# Patient Record
Sex: FEMALE | Race: BLACK OR AFRICAN AMERICAN | NOT HISPANIC OR LATINO | Employment: FULL TIME | URBAN - METROPOLITAN AREA
[De-identification: names, ages, dates, MRNs, and addresses within clinical notes are randomized per-mention and may not be internally consistent; named-entity substitution may affect disease eponyms.]

---
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
## 2024-01-08 ENCOUNTER — OFFICE VISIT (OUTPATIENT)
Dept: FAMILY MEDICINE | Facility: CLINIC | Age: 19
End: 2024-01-08
Payer: MEDICAID

## 2024-01-08 VITALS
TEMPERATURE: 99 F | BODY MASS INDEX: 21.36 KG/M2 | OXYGEN SATURATION: 98 % | RESPIRATION RATE: 18 BRPM | HEIGHT: 60 IN | HEART RATE: 70 BPM | SYSTOLIC BLOOD PRESSURE: 121 MMHG | WEIGHT: 108.81 LBS | DIASTOLIC BLOOD PRESSURE: 87 MMHG

## 2024-01-08 DIAGNOSIS — R10.9 ABDOMINAL PAIN, UNSPECIFIED ABDOMINAL LOCATION: Primary | ICD-10-CM

## 2024-01-08 DIAGNOSIS — E83.52 HYPERCALCEMIA: ICD-10-CM

## 2024-01-08 LAB
ALBUMIN SERPL-MCNC: 4.6 G/DL (ref 3.5–5)
ALBUMIN/GLOB SERPL: 1.1 RATIO (ref 1.1–2)
ALP SERPL-CCNC: 59 UNIT/L (ref 40–150)
ALT SERPL-CCNC: 7 UNIT/L (ref 0–55)
AST SERPL-CCNC: 18 UNIT/L (ref 5–34)
BILIRUB SERPL-MCNC: 0.7 MG/DL
BUN SERPL-MCNC: 9.9 MG/DL (ref 7–18.7)
CALCIUM SERPL-MCNC: 9.8 MG/DL (ref 8.4–10.2)
CHLORIDE SERPL-SCNC: 104 MMOL/L (ref 98–107)
CO2 SERPL-SCNC: 26 MMOL/L (ref 22–29)
CREAT SERPL-MCNC: 0.96 MG/DL (ref 0.55–1.02)
GFR SERPLBLD CREATININE-BSD FMLA CKD-EPI: >60 MLS/MIN/1.73/M2
GLOBULIN SER-MCNC: 4.2 GM/DL (ref 2.4–3.5)
GLUCOSE SERPL-MCNC: 81 MG/DL (ref 74–100)
POTASSIUM SERPL-SCNC: 4.2 MMOL/L (ref 3.5–5.1)
PROT SERPL-MCNC: 8.8 GM/DL (ref 6.4–8.3)
SODIUM SERPL-SCNC: 138 MMOL/L (ref 136–145)

## 2024-01-08 PROCEDURE — 36415 COLL VENOUS BLD VENIPUNCTURE: CPT

## 2024-01-08 PROCEDURE — 80053 COMPREHEN METABOLIC PANEL: CPT

## 2024-01-08 PROCEDURE — 99214 OFFICE O/P EST MOD 30 MIN: CPT | Mod: PBBFAC

## 2024-01-08 RX ORDER — POLYETHYLENE GLYCOL 3350 17 G/17G
17 POWDER, FOR SOLUTION ORAL DAILY
COMMUNITY
Start: 2023-12-27

## 2024-01-08 RX ORDER — ONDANSETRON 4 MG/1
8 TABLET, FILM COATED ORAL EVERY 8 HOURS PRN
COMMUNITY
Start: 2023-12-27

## 2024-01-08 RX ORDER — HYOSCYAMINE SULFATE 0.12 MG/1
0.12 TABLET SUBLINGUAL EVERY 6 HOURS PRN
COMMUNITY
Start: 2023-12-27

## 2024-01-08 NOTE — PROGRESS NOTES
Discussed with resident at time of encounter 01-08-24.  Intermittent abdominal discomfort since 09/2023; variable intensity.  Recent ED visit.    Resident's note reviewed 01-08-24.  Agree with assessment; plan of care appropriate.  Follow-up ordered studies.  Professional services provided in an outpatient primary care center affiliated with a teaching institution.

## 2024-01-08 NOTE — PROGRESS NOTES
[facility]    Subjective:      Chief Complaint: Hospital Follow Up    HPI  Jena Tabor is a 18 y.o. female who presents to [facility] for ED follow-up due to abdominal pain on 12/26/2023. Generalized abdominal pain began around September, fairly constant with episodes of increased pain, associated with difficulties passing stool for 1-2 days. States pain is typically positional in nature, increases with valsalva, unable to reproduce pain in clinic today. Rated 6/10. She also has occasional nausea. Pain moderately relieved by hyoscyamine. Of note she does take naproxen 2-3 times per month due to dysmenorrhea. KUB obtained in ED showed retained stool without obstruction. Diet consists of no fried or spicy foods, primarily eats healthy options since abdominal pain started. Denies possibility of pregnancy, LMP 12/18/2023. Denies abdominal injury, urinary symptoms, diarrhea/constipation, bilious emesis, chest pain, palpitations, SOB, menorrhagia, vaginal discharge.     Review of Systems  As per HPI.    Objective:     /87 (BP Location: Right arm, Patient Position: Sitting, BP Method: Medium (Automatic))   Pulse 70   Temp 98.8 °F (37.1 °C) (Oral)   Resp 18   Ht 5' (1.524 m)   Wt 49.4 kg (108 lb 12.8 oz)   SpO2 98%   BMI 21.25 kg/m²     Physical Exam:  Gen: No acute distress, pleasant female  CV: RRR, no murmurs.  Resp: CTAB, breathing non labored on room air.  Abd: Soft, non-distended. Mildly tender to RUQ, no guarding. Guido's sign negative. No tenderness in other quadrants. No tenderness over McBurney's point. No CVA tenderness. No apparent herniation.   Skin: Skin intact over entire abdomen. Umbilical ring without erythema or induration, no active drainage, appears well healed.       Current Outpatient Medications:     hyoscyamine (LEVSIN/SL) 0.125 mg Subl, Place 0.125 mg under the tongue every 6 (six) hours as needed., Disp: , Rfl:     ondansetron (ZOFRAN) 4 MG tablet, Take 8 mg by mouth  every 8 (eight) hours as needed., Disp: , Rfl:     polyethylene glycol (GLYCOLAX) 17 gram/dose powder, Take 17 g by mouth once daily., Disp: , Rfl:     diclofenac sodium (VOLTAREN) 1 % Gel, Apply 2 g topically 4 (four) times daily., Disp: 100 g, Rfl: 1    fluticasone propionate (FLONASE) 50 mcg/actuation nasal spray, 1 spray (50 mcg total) by Each Nostril route once daily., Disp: 9.9 mL, Rfl: 0    naproxen (NAPROSYN) 250 MG tablet, Take 250 mg by mouth 2 (two) times daily as needed., Disp: , Rfl:      Assessment/Plan:     Chronic generalized abdominal pain  Hypercalcemia  - Ca 10.9 on outside labs  - Repeat CMP ordered  - RUQ US ordered, consider CTAP if pain continues  - Continue hyoscyamine PRN for pain and Zofran PRN for nausea  - Counseled on dietary habits and increased fiber intake, continue Miralax qd  - ED precautions discussed    Follow-up: 6 weeks for abdominal pain    Tariq Dominique MD   [employer] [facility] - PGY-II

## 2024-01-09 ENCOUNTER — HOSPITAL ENCOUNTER (EMERGENCY)
Facility: HOSPITAL | Age: 19
Discharge: HOME | End: 2024-01-10
Attending: STUDENT IN AN ORGANIZED HEALTH CARE EDUCATION/TRAINING PROGRAM
Payer: MEDICAID

## 2024-01-09 DIAGNOSIS — K59.09 CHRONIC CONSTIPATION: ICD-10-CM

## 2024-01-09 DIAGNOSIS — R10.9 ABDOMINAL CRAMPING: Primary | ICD-10-CM

## 2024-01-09 LAB
ALBUMIN SERPL-MCNC: 4.2 G/DL (ref 3.5–5)
ALBUMIN/GLOB SERPL: 1 RATIO (ref 1.1–2)
ALP SERPL-CCNC: 57 UNIT/L (ref 40–150)
ALT SERPL-CCNC: 7 UNIT/L (ref 0–55)
APPEARANCE UR: CLEAR
AST SERPL-CCNC: 18 UNIT/L (ref 5–34)
B-HCG SERPL QL: NEGATIVE
BACTERIA #/AREA URNS AUTO: ABNORMAL /HPF
BASOPHILS # BLD AUTO: 0.02 X10(3)/MCL
BASOPHILS NFR BLD AUTO: 0.4 %
BILIRUB SERPL-MCNC: 0.9 MG/DL
BILIRUB UR QL STRIP.AUTO: NEGATIVE
BUN SERPL-MCNC: 10.6 MG/DL (ref 7–18.7)
CALCIUM SERPL-MCNC: 10.2 MG/DL (ref 8.4–10.2)
CHLORIDE SERPL-SCNC: 105 MMOL/L (ref 98–107)
CO2 SERPL-SCNC: 24 MMOL/L (ref 22–29)
COLOR UR AUTO: COLORLESS
CREAT SERPL-MCNC: 0.92 MG/DL (ref 0.55–1.02)
EOSINOPHIL # BLD AUTO: 0.16 X10(3)/MCL (ref 0–0.9)
EOSINOPHIL NFR BLD AUTO: 2.9 %
ERYTHROCYTE [DISTWIDTH] IN BLOOD BY AUTOMATED COUNT: 12 % (ref 11.5–17)
GFR SERPLBLD CREATININE-BSD FMLA CKD-EPI: >60 MLS/MIN/1.73/M2
GLOBULIN SER-MCNC: 4.2 GM/DL (ref 2.4–3.5)
GLUCOSE SERPL-MCNC: 88 MG/DL (ref 74–100)
GLUCOSE UR QL STRIP.AUTO: NORMAL
HCT VFR BLD AUTO: 40 % (ref 37–47)
HGB BLD-MCNC: 13.3 G/DL (ref 12–16)
IMM GRANULOCYTES # BLD AUTO: 0.01 X10(3)/MCL (ref 0–0.04)
IMM GRANULOCYTES NFR BLD AUTO: 0.2 %
KETONES UR QL STRIP.AUTO: NEGATIVE
LEUKOCYTE ESTERASE UR QL STRIP.AUTO: 25
LIPASE SERPL-CCNC: 14 U/L
LYMPHOCYTES # BLD AUTO: 1.61 X10(3)/MCL (ref 0.6–4.6)
LYMPHOCYTES NFR BLD AUTO: 29 %
MCH RBC QN AUTO: 28.4 PG (ref 27–31)
MCHC RBC AUTO-ENTMCNC: 33.3 G/DL (ref 33–36)
MCV RBC AUTO: 85.5 FL (ref 80–94)
MONOCYTES # BLD AUTO: 0.35 X10(3)/MCL (ref 0.1–1.3)
MONOCYTES NFR BLD AUTO: 6.3 %
NEUTROPHILS # BLD AUTO: 3.41 X10(3)/MCL (ref 2.1–9.2)
NEUTROPHILS NFR BLD AUTO: 61.2 %
NITRITE UR QL STRIP.AUTO: NEGATIVE
NRBC BLD AUTO-RTO: 0 %
PH UR STRIP.AUTO: 6 [PH]
PLATELET # BLD AUTO: 269 X10(3)/MCL (ref 130–400)
PMV BLD AUTO: 11 FL (ref 7.4–10.4)
POTASSIUM SERPL-SCNC: 4.1 MMOL/L (ref 3.5–5.1)
PROT SERPL-MCNC: 8.4 GM/DL (ref 6.4–8.3)
PROT UR QL STRIP.AUTO: NEGATIVE
RBC # BLD AUTO: 4.68 X10(6)/MCL (ref 4.2–5.4)
RBC #/AREA URNS AUTO: ABNORMAL /HPF
RBC UR QL AUTO: NEGATIVE
SODIUM SERPL-SCNC: 136 MMOL/L (ref 136–145)
SP GR UR STRIP.AUTO: 1.01 (ref 1–1.03)
SQUAMOUS #/AREA URNS LPF: ABNORMAL /HPF
UROBILINOGEN UR STRIP-ACNC: NORMAL
WBC # SPEC AUTO: 5.56 X10(3)/MCL (ref 4.5–11.5)
WBC #/AREA URNS AUTO: ABNORMAL /HPF

## 2024-01-09 PROCEDURE — 81001 URINALYSIS AUTO W/SCOPE: CPT | Performed by: NURSE PRACTITIONER

## 2024-01-09 PROCEDURE — 99285 EMERGENCY DEPT VISIT HI MDM: CPT | Mod: 25

## 2024-01-09 PROCEDURE — 85025 COMPLETE CBC W/AUTO DIFF WBC: CPT | Performed by: NURSE PRACTITIONER

## 2024-01-09 PROCEDURE — 80053 COMPREHEN METABOLIC PANEL: CPT | Performed by: NURSE PRACTITIONER

## 2024-01-09 PROCEDURE — 81025 URINE PREGNANCY TEST: CPT | Performed by: NURSE PRACTITIONER

## 2024-01-09 PROCEDURE — 83690 ASSAY OF LIPASE: CPT | Performed by: NURSE PRACTITIONER

## 2024-01-09 RX ORDER — ONDANSETRON 2 MG/ML
4 INJECTION INTRAMUSCULAR; INTRAVENOUS
Status: DISCONTINUED | OUTPATIENT
Start: 2024-01-09 | End: 2024-01-10 | Stop reason: HOSPADM

## 2024-01-09 RX ADMIN — IOPAMIDOL 100 ML: 755 INJECTION, SOLUTION INTRAVENOUS at 11:01

## 2024-01-09 NOTE — FIRST PROVIDER EVALUATION
Medical screening examination initiated.  I have conducted a focused provider triage encounter, findings are as follows:    Brief history of present illness:  Patient states abdominal pain, nausea, vomiting, and diarrhea.     There were no vitals filed for this visit.    Pertinent physical exam:  Awake, alert, ambulatory    Brief workup plan:  Labs    Preliminary workup initiated; this workup will be continued and followed by the physician or advanced practice provider that is assigned to the patient when roomed.

## 2024-01-10 VITALS
TEMPERATURE: 98 F | RESPIRATION RATE: 18 BRPM | WEIGHT: 108 LBS | HEIGHT: 62 IN | OXYGEN SATURATION: 100 % | DIASTOLIC BLOOD PRESSURE: 73 MMHG | BODY MASS INDEX: 19.88 KG/M2 | SYSTOLIC BLOOD PRESSURE: 124 MMHG | HEART RATE: 76 BPM

## 2024-01-10 PROCEDURE — 25500020 PHARM REV CODE 255: Performed by: PHYSICIAN ASSISTANT

## 2024-01-10 RX ORDER — HYOSCYAMINE SULFATE 0.12 MG/1
0.12 TABLET SUBLINGUAL EVERY 6 HOURS PRN
Qty: 20 TABLET | Refills: 0 | Status: SHIPPED | OUTPATIENT
Start: 2024-01-10 | End: 2024-01-15

## 2024-01-10 RX ORDER — ONDANSETRON 4 MG/1
4 TABLET, FILM COATED ORAL EVERY 6 HOURS
Qty: 12 TABLET | Refills: 0 | Status: SHIPPED | OUTPATIENT
Start: 2024-01-10

## 2024-01-11 NOTE — PROGRESS NOTES
Pt called inquiring about status of referral for GI. Provided [facility] clinic contact.

## 2024-01-19 ENCOUNTER — OFFICE VISIT (OUTPATIENT)
Dept: FAMILY MEDICINE | Facility: CLINIC | Age: 19
End: 2024-01-19
Payer: MEDICAID

## 2024-01-19 VITALS
OXYGEN SATURATION: 100 % | TEMPERATURE: 98 F | BODY MASS INDEX: 20.06 KG/M2 | HEIGHT: 62 IN | HEART RATE: 58 BPM | DIASTOLIC BLOOD PRESSURE: 70 MMHG | WEIGHT: 109 LBS | SYSTOLIC BLOOD PRESSURE: 108 MMHG | RESPIRATION RATE: 17 BRPM

## 2024-01-19 DIAGNOSIS — R10.84 GENERALIZED ABDOMINAL PAIN: Primary | ICD-10-CM

## 2024-01-19 LAB
HIV 1+2 AB+HIV1 P24 AG SERPL QL IA: NONREACTIVE
TSH SERPL-ACNC: 1.17 UIU/ML (ref 0.35–4.94)

## 2024-01-19 PROCEDURE — 99215 OFFICE O/P EST HI 40 MIN: CPT | Mod: PBBFAC | Performed by: STUDENT IN AN ORGANIZED HEALTH CARE EDUCATION/TRAINING PROGRAM

## 2024-01-19 PROCEDURE — 36415 COLL VENOUS BLD VENIPUNCTURE: CPT | Performed by: STUDENT IN AN ORGANIZED HEALTH CARE EDUCATION/TRAINING PROGRAM

## 2024-01-19 PROCEDURE — 87389 HIV-1 AG W/HIV-1&-2 AB AG IA: CPT | Performed by: STUDENT IN AN ORGANIZED HEALTH CARE EDUCATION/TRAINING PROGRAM

## 2024-01-19 PROCEDURE — 84443 ASSAY THYROID STIM HORMONE: CPT | Performed by: STUDENT IN AN ORGANIZED HEALTH CARE EDUCATION/TRAINING PROGRAM

## 2024-01-19 RX ORDER — ONDANSETRON 4 MG/1
4 TABLET, ORALLY DISINTEGRATING ORAL EVERY 8 HOURS PRN
COMMUNITY
Start: 2024-01-14 | End: 2024-01-21

## 2024-01-19 RX ORDER — PANTOPRAZOLE SODIUM 40 MG/1
1 TABLET, DELAYED RELEASE ORAL EVERY MORNING
COMMUNITY
Start: 2024-01-14 | End: 2025-01-13

## 2024-01-19 RX ORDER — SUCRALFATE 1 G/10ML
1 SUSPENSION ORAL
COMMUNITY
Start: 2024-01-14 | End: 2024-02-13

## 2024-01-19 RX ORDER — FAMOTIDINE 20 MG/1
20 TABLET, FILM COATED ORAL 2 TIMES DAILY
COMMUNITY
Start: 2024-01-14 | End: 2025-01-13

## 2024-01-19 NOTE — LETTER
January 19, 2024    Jena Tabor  [address] [facility] [address]             [facility]  Family Medicine  [facility] [facility] [address]  Phone: [phone]   January 19, 2024     Patient: Jena Tabor   YOB: 2005   Date of Visit: 1/19/2024       To Whom it May Concern:    Jena Tabor was seen in my clinic on 1/19/2024. She was accompanied by her mother Jaida Tabor.    Please excuse her and her mother from any work/classes missed.    If you have any questions or concerns, please don't hesitate to call.    Sincerely,         Laura Nix MD

## 2024-01-19 NOTE — PROGRESS NOTES
[facility] OFFICE VISIT NOTE  Jena Tabor  [id number]  01/19/2024      Chief Complaint: GI Problem, Fatigue, Medication Refill (Naproxen), Referral (GI), and Food Insecurity Absent      HPI    18 y.o. female     Generalized abdominal pain, onset 12/26/23  - sharp, constant with fluctuating intensity, no radiation   - associated with intermittent nausea and vomiting and fatigue  - onset out of the blue, no known triggers  - came home from college in December  - ate mostly Ramen and fried food in college, now only tolerating bland foods (toast, soaps, pasta, rice, etc.)   - no association with foods, timing of menstrual period or bowel movements  - currently on menstrual period, not sexually active    - constipated for 1-2 months prior to onset of severe abdominal pain    - last bowel movement 2 days ago- hard and small volume, has bowel movement once every few days  - seen in ED and clinic multiple times since onset, work up so far unrevealing (lipase, CBC, CMP nml, UPT neg, UA neg, CT Abd/Pelvis physiologic pelvic fluid,  RUQ US- gallbladder, pancreas, liver unremarkable)   - Zofran relieves nausea short term, other medications currently taking with little to no improvement- Levsin, Miralax, famotidine, Protonix and sucralfate   - weight appears stable since onset of symptoms   - occasional moderate alcohol use- none since onset of pain, denies tobacco, marijuana and other drug use   - denies frequent NSAID use  - paternal grandmother with ulcerative colitis, maternal grandmother with rheumatoid arthritis and gallbladder disease, no known history of GI cancers or colon polyps  - no previous h/o similar abdominal pain  - no recent international travel or contact with similar symptoms       ROS:  As per HPI      PE:  Vitals:    01/19/24 1351   BP: 108/70   Pulse: (!) 58   Resp: 17   Temp: 98.1 °F (36.7 °C)     General: appears well, in no acute distress   Eye: no scleral icterus   HENT: oral mucosa without erosion or  erythema   Neck: no goiter   Respiratory: clear to auscultation bilaterally, nonlabored respirations   Cardiovascular: borderline bradycardia, regular rhythm, murmurs   Gastrointestinal: soft, generalized tenderness to superficial ad deep palpation more prominent in RUQ and epigastric region, non-distended, bowel sounds present, no rebound or guarding   Genitourinary: no suprapubic tenderness, no CVA tenderness    Extremities: no edema in bilateral lower extremities    Assessment:   1. Generalized abdominal pain        Plan:  - reviewed work up to date with patient and mom   - medication holiday for 2-3 days, then re-introduce each medication one at a time to re-evaluate response  - okay to increase Miralax to BID dosing until stool soft  - referral GI (Dr. ARVIND Yanez- [place])  - H pylori stool Ag, TSH and HIV  - US pelvis   - recommended avoidance of anti-inflammatories due to possibility for gastritis/ peptic ulcer   - return/ED precautions provided     Keep previously schedule follow up in 1 month, or sooner if needed.     Laura Nix M.D. -III   [facility] [place]

## 2024-01-22 ENCOUNTER — LAB VISIT (OUTPATIENT)
Dept: LAB | Facility: HOSPITAL | Age: 19
End: 2024-01-22
Attending: STUDENT IN AN ORGANIZED HEALTH CARE EDUCATION/TRAINING PROGRAM
Payer: MEDICAID

## 2024-01-22 LAB — H. PYLORI STOOL: NEGATIVE

## 2024-01-31 ENCOUNTER — HOSPITAL ENCOUNTER (OUTPATIENT)
Dept: RADIOLOGY | Facility: HOSPITAL | Age: 19
Discharge: HOME | End: 2024-01-31
Payer: MEDICAID

## 2024-01-31 ENCOUNTER — HOSPITAL ENCOUNTER (OUTPATIENT)
Dept: RADIOLOGY | Facility: HOSPITAL | Age: 19
Discharge: HOME | End: 2024-01-31
Attending: STUDENT IN AN ORGANIZED HEALTH CARE EDUCATION/TRAINING PROGRAM
Payer: MEDICAID

## 2024-01-31 DIAGNOSIS — R10.9 ABDOMINAL PAIN, UNSPECIFIED ABDOMINAL LOCATION: ICD-10-CM

## 2024-01-31 DIAGNOSIS — R10.84 GENERALIZED ABDOMINAL PAIN: ICD-10-CM

## 2024-01-31 PROCEDURE — 76705 ECHO EXAM OF ABDOMEN: CPT | Mod: TC

## 2024-01-31 PROCEDURE — 76856 US EXAM PELVIC COMPLETE: CPT | Mod: TC

## 2024-03-20 ENCOUNTER — OFFICE VISIT (OUTPATIENT)
Dept: FAMILY MEDICINE | Facility: CLINIC | Age: 19
End: 2024-03-20
Payer: COMMERCIAL

## 2024-03-20 VITALS
HEIGHT: 62 IN | RESPIRATION RATE: 18 BRPM | TEMPERATURE: 98 F | DIASTOLIC BLOOD PRESSURE: 74 MMHG | SYSTOLIC BLOOD PRESSURE: 102 MMHG | OXYGEN SATURATION: 98 % | HEART RATE: 105 BPM | BODY MASS INDEX: 21.05 KG/M2 | WEIGHT: 114.38 LBS

## 2024-03-20 DIAGNOSIS — J06.9 VIRAL URI: Primary | ICD-10-CM

## 2024-03-20 DIAGNOSIS — J02.9 SORE THROAT: ICD-10-CM

## 2024-03-20 LAB
FLUAV AG UPPER RESP QL IA.RAPID: NOT DETECTED
FLUBV AG UPPER RESP QL IA.RAPID: NOT DETECTED
SARS-COV-2 RNA RESP QL NAA+PROBE: NOT DETECTED

## 2024-03-20 PROCEDURE — 0240U COVID/FLU A&B PCR: CPT

## 2024-03-20 PROCEDURE — 99214 OFFICE O/P EST MOD 30 MIN: CPT | Mod: PBBFAC

## 2024-03-20 NOTE — PROGRESS NOTES
Discussed with resident at time of encounter 03-20-24.  Sore throat, congestion, bodyaches.    Resident's note reviewed 03-20-24.  Agree with assessment; plan of care appropriate.  Follow-up ordered diagnostic studies.      Professional services provided in an outpatient primary care center affiliated with a [facility] institution.

## 2024-03-20 NOTE — PROGRESS NOTES
"[facility]    Subjective:      Chief Complaint: Sore Throat, Nasal Congestion, and Generalized Body Aches    HPI   Jena Tabor is a 18 y.o. female who presents to [facility] for sore throat, chills, and generalized body aches that started yesterday morning. No changes in PO intake. Tried to manage symptoms with cold/flu medication yesterday with minimal relief. No recent sick contacts. Also reports congestion yesterday with intermittent rhinorrhea. Denies cough, ear pain, sinus pressure, difficulty swallowing, abdominal pain, n/v, diarrhea.    Review of Systems  As per HPI.    Objective:     /74 (BP Location: Right arm, Patient Position: Sitting, BP Method: Medium (Automatic))   Pulse 105   Temp 97.9 °F (36.6 °C) (Oral)   Resp 18   Ht 5' 2.01" (1.575 m)   Wt 51.9 kg (114 lb 6.4 oz)   SpO2 98%   BMI 20.92 kg/m²     Physical Exam:  Gen: Appears ill but in no acute distress, shivering in room, afebrile on vitals  HEENT: Throat mildly erythematous without exudates, boggy nasal turbinates, TM clear, moist mucous membranes, no cervical lymphadenopathy  CV: RRR, no murmurs.   Resp: CTAB, breathing non labored on room air.  Abd: Soft, non-distended, non-tender, bowel sounds normoactive.      Current Outpatient Medications:     diclofenac sodium (VOLTAREN) 1 % Gel, Apply 2 g topically 4 (four) times daily., Disp: 100 g, Rfl: 1    famotidine (PEPCID) 20 MG tablet, Take 20 mg by mouth 2 (two) times daily., Disp: , Rfl:     fluticasone propionate (FLONASE) 50 mcg/actuation nasal spray, 1 spray (50 mcg total) by Each Nostril route once daily., Disp: 9.9 mL, Rfl: 0    hyoscyamine (LEVSIN/SL) 0.125 mg Subl, Place 0.125 mg under the tongue every 6 (six) hours as needed., Disp: , Rfl:     naproxen (NAPROSYN) 250 MG tablet, Take 250 mg by mouth 2 (two) times daily as needed., Disp: , Rfl:     ondansetron (ZOFRAN) 4 MG tablet, Take 8 mg by mouth every 8 (eight) hours as needed., Disp: , Rfl:     ondansetron " (ZOFRAN) 4 MG tablet, Take 1 tablet (4 mg total) by mouth every 6 (six) hours., Disp: 12 tablet, Rfl: 0    pantoprazole (PROTONIX) 40 MG tablet, Take 1 tablet by mouth every morning., Disp: , Rfl:     polyethylene glycol (GLYCOLAX) 17 gram/dose powder, Take 17 g by mouth once daily., Disp: , Rfl:      Assessment/Plan:     Sore throat  Viral URI  - COVID/Flu swab pending  - Will call with results later today  - Tamiflu if Flu positive  - Symptomatic management  - Stay well hydrated    Follow-up: THERESE Dominique MD   [facility] - PGY-II